# Patient Record
Sex: FEMALE | Race: WHITE | NOT HISPANIC OR LATINO | Employment: OTHER | URBAN - NONMETROPOLITAN AREA
[De-identification: names, ages, dates, MRNs, and addresses within clinical notes are randomized per-mention and may not be internally consistent; named-entity substitution may affect disease eponyms.]

---

## 2023-06-06 ENCOUNTER — OFFICE VISIT (OUTPATIENT)
Dept: PRIMARY CARE | Facility: CLINIC | Age: 68
End: 2023-06-06
Payer: MEDICARE

## 2023-06-06 VITALS
WEIGHT: 194 LBS | HEART RATE: 77 BPM | SYSTOLIC BLOOD PRESSURE: 130 MMHG | HEIGHT: 69 IN | OXYGEN SATURATION: 98 % | BODY MASS INDEX: 28.73 KG/M2 | DIASTOLIC BLOOD PRESSURE: 82 MMHG

## 2023-06-06 DIAGNOSIS — R00.2 PALPITATION: ICD-10-CM

## 2023-06-06 DIAGNOSIS — Z13.220 SCREENING, LIPID: ICD-10-CM

## 2023-06-06 DIAGNOSIS — R94.31 ABNORMAL EKG: ICD-10-CM

## 2023-06-06 DIAGNOSIS — Z12.31 ENCOUNTER FOR SCREENING MAMMOGRAM FOR MALIGNANT NEOPLASM OF BREAST: ICD-10-CM

## 2023-06-06 DIAGNOSIS — Z13.6 SCREENING FOR HEART DISEASE: ICD-10-CM

## 2023-06-06 DIAGNOSIS — Z01.818 PRE-OP EXAMINATION: Primary | ICD-10-CM

## 2023-06-06 PROBLEM — H33.001 RHEGMATOGENOUS RETINAL DETACHMENT OF RIGHT EYE: Status: ACTIVE | Noted: 2023-05-31

## 2023-06-06 PROBLEM — H52.13 SEVERE MYOPIA OF BOTH EYES: Status: ACTIVE | Noted: 2023-05-31

## 2023-06-06 PROCEDURE — 1157F ADVNC CARE PLAN IN RCRD: CPT | Performed by: INTERNAL MEDICINE

## 2023-06-06 PROCEDURE — 99203 OFFICE O/P NEW LOW 30 MIN: CPT | Performed by: INTERNAL MEDICINE

## 2023-06-06 PROCEDURE — 1036F TOBACCO NON-USER: CPT | Performed by: INTERNAL MEDICINE

## 2023-06-06 PROCEDURE — 1159F MED LIST DOCD IN RCRD: CPT | Performed by: INTERNAL MEDICINE

## 2023-06-06 PROCEDURE — 1160F RVW MEDS BY RX/DR IN RCRD: CPT | Performed by: INTERNAL MEDICINE

## 2023-06-06 ASSESSMENT — ENCOUNTER SYMPTOMS
WHEEZING: 0
BLOOD IN STOOL: 0
FATIGUE: 0
PALPITATIONS: 1
BACK PAIN: 0
CHEST TIGHTNESS: 0
SHORTNESS OF BREATH: 0
DIARRHEA: 0
ABDOMINAL PAIN: 0
VOMITING: 0
ARTHRALGIAS: 0
COUGH: 0
NAUSEA: 0

## 2023-06-06 NOTE — PROGRESS NOTES
"Subjective   Patient ID: Jazz Montiel is a 67 y.o. female who presents for No chief complaint on file..  HPI  She presents today for preoperative assessment.  This is the first time I am seeing her today.  She has been very healthy up to this time and typically does not go in regularly for doctors visits.  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  She states that she was given the option of \"twilight \"anesthetic as opposed to general anesthetic in the operating room.  She states she chose the latter.  We did do an EKG and I did inform her that her EKG is not completely normal.  It looks like she has an interventricular conduction delay.  We also discussed some risk factors and she informs me that her brother unfortunately had to have cardiac stents due to coronary artery disease.  She states she recalls that he was in his 70s when he had his issues.  She states that she has been very healthy and she has had no significant past medical problems.  She states she joined [employer] in March and has been going pretty regularly.  She states she goes 3 days out of the week and she does an hour of the program and then does an additional hour on the knotless equipment.  During those times she experiences no chest discomfort and no breathing difficulties.  She also denies feeling lightheaded or dizzy.  Her blood pressure is satisfactory today.  She states she is unsure as to whether she is even had her cholesterol checked in the past.  We talked about trying to get her procedure done in a timely fashion because of the retinal detachment.  I told her that typically we like to have time with preoperative assessments but given the fact that she has amazing exercise tolerance and an essentially benign past medical history I think it would be reasonable to proceed with her surgical intervention tomorrow if she does not under the \"twilight \"anesthetic.  " She has had several prior general anesthetic challenges and is done well with no complications from anesthetic.  She has had cosmetic surgery and general surgery .We can contact her surgeon about this and she is perfectly willing to choose that option.  We also discussed getting her back in for more thorough assessment of her condition.  We discussed checking a cholesterol we also discussed doing a coronary calcium scan.  I will also be ordering some lab work and she is also agreeable to getting caught up with a screening mammogram.  We did conduct a review of systems and her only real complaint today is that of some arthritis in her knees.  I will summarize everything in a problem based format  Review of Systems   Constitutional:  Negative for fatigue.   Respiratory:  Negative for cough, chest tightness, shortness of breath and wheezing.    Cardiovascular:  Positive for palpitations. Negative for chest pain and leg swelling.   Gastrointestinal:  Negative for abdominal pain, blood in stool, diarrhea, nausea and vomiting.   Musculoskeletal:  Negative for arthralgias and back pain.     Objective   Physical Exam  Vitals and nursing note reviewed.   Constitutional:       General: She is not in acute distress.     Appearance: Normal appearance.   HENT:      Head: Normocephalic and atraumatic.   Eyes:      Conjunctiva/sclera: Conjunctivae normal.   Cardiovascular:      Rate and Rhythm: Normal rate and regular rhythm.      Heart sounds: Normal heart sounds.   Pulmonary:      Effort: No respiratory distress.      Breath sounds: No wheezing.   Abdominal:      Palpations: Abdomen is soft.      Tenderness: There is no abdominal tenderness. There is no guarding.   Musculoskeletal:         General: No swelling. Normal range of motion.   Skin:     General: Skin is warm and dry.   Neurological:      General: No focal deficit present.      Mental Status: She is alert and oriented to person, place, and time.   Psychiatric:          Behavior: Behavior normal.       Assessment/Plan          Shayla Gilliland, DO

## 2023-06-06 NOTE — ASSESSMENT & PLAN NOTE
-Her palpitations she reports are very infrequent and do not produce any other symptoms.  -Cardiac auscultatory exam today appears normal

## 2023-06-06 NOTE — PATIENT INSTRUCTIONS
"As we discussed I believe that you will be able to partake in your surgical procedure tomorrow for your retinal detachment.  We realized that time is of the essence to get this procedure done as quickly as possible.  I am recommending that you not do the general anesthetic but instead do the \"twilight \"anesthetic.  We believe that risks are lower than with general anesthetic.  I am pleased with your exercise tolerance and the fact that you really have not had any significant health problems in the past  As we agreed we will see you back several weeks after you have completed your procedure and I would like for you to have a coronary calcium scan.  This test is a tool to look for possibly heart disease and because of your brother I think we should be screening you.  The test is free of charge and does not involve any needles or IVs.  There is some element of radiation exposure because it is a CAT scan.  I also would like for you to go to the lab at your earliest convenience and have fasting lab work to check your cholesterol, blood sugar, kidney function and liver.  When you return we will go over the results and if there is something significantly abnormal I will contact you earlier  The staff will also help you get your mammogram scheduled and then we can go over other health maintenance recommendations at your return visit  "

## 2023-06-06 NOTE — ASSESSMENT & PLAN NOTE
"-She is scheduled to have surgery on her right eye due to a retinal detachment for tomorrow.  We will contact her surgeon and indicate that she has been cleared for the procedure but we would like to go under the \"twilight \"anesthetic as opposed to a general anesthetic given today's circumstances.  She has no symptoms and exhibits pretty good exercise tolerance.  -Because of her abnormal EKG and family history of heart disease she has agreed to return forth further assessment.  I recommended she have a coronary calcium scan and we will also be checking a cholesterol in addition to some lab work.  "

## 2023-08-03 ENCOUNTER — LAB (OUTPATIENT)
Dept: LAB | Facility: LAB | Age: 68
End: 2023-08-03
Payer: MEDICARE

## 2023-08-03 DIAGNOSIS — Z13.6 SCREENING FOR HEART DISEASE: ICD-10-CM

## 2023-08-03 DIAGNOSIS — R00.2 PALPITATION: ICD-10-CM

## 2023-08-03 DIAGNOSIS — Z01.818 PRE-OP EXAMINATION: ICD-10-CM

## 2023-08-03 LAB
ALANINE AMINOTRANSFERASE (SGPT) (U/L) IN SER/PLAS: 22 U/L (ref 7–45)
ALBUMIN (G/DL) IN SER/PLAS: 4.1 G/DL (ref 3.4–5)
ALKALINE PHOSPHATASE (U/L) IN SER/PLAS: 100 U/L (ref 33–136)
ANION GAP IN SER/PLAS: 9 MMOL/L (ref 10–20)
ASPARTATE AMINOTRANSFERASE (SGOT) (U/L) IN SER/PLAS: 27 U/L (ref 9–39)
BASOPHILS (10*3/UL) IN BLOOD BY AUTOMATED COUNT: 0.03 X10E9/L (ref 0–0.1)
BASOPHILS/100 LEUKOCYTES IN BLOOD BY AUTOMATED COUNT: 0.6 % (ref 0–2)
BILIRUBIN TOTAL (MG/DL) IN SER/PLAS: 0.4 MG/DL (ref 0–1.2)
CALCIUM (MG/DL) IN SER/PLAS: 8.5 MG/DL (ref 8.6–10.3)
CARBON DIOXIDE, TOTAL (MMOL/L) IN SER/PLAS: 28 MMOL/L (ref 21–32)
CHLORIDE (MMOL/L) IN SER/PLAS: 108 MMOL/L (ref 98–107)
CHOLESTEROL (MG/DL) IN SER/PLAS: 223 MG/DL (ref 0–199)
CHOLESTEROL IN HDL (MG/DL) IN SER/PLAS: 50 MG/DL
CHOLESTEROL/HDL RATIO: 4.5
CREATININE (MG/DL) IN SER/PLAS: 0.79 MG/DL (ref 0.5–1.05)
EOSINOPHILS (10*3/UL) IN BLOOD BY AUTOMATED COUNT: 0.13 X10E9/L (ref 0–0.7)
EOSINOPHILS/100 LEUKOCYTES IN BLOOD BY AUTOMATED COUNT: 2.8 % (ref 0–6)
ERYTHROCYTE DISTRIBUTION WIDTH (RATIO) BY AUTOMATED COUNT: 13.3 % (ref 11.5–14.5)
ERYTHROCYTE MEAN CORPUSCULAR HEMOGLOBIN CONCENTRATION (G/DL) BY AUTOMATED: 31.6 G/DL (ref 32–36)
ERYTHROCYTE MEAN CORPUSCULAR VOLUME (FL) BY AUTOMATED COUNT: 101 FL (ref 80–100)
ERYTHROCYTES (10*6/UL) IN BLOOD BY AUTOMATED COUNT: 4.5 X10E12/L (ref 4–5.2)
GFR FEMALE: 82 ML/MIN/1.73M2
GLUCOSE (MG/DL) IN SER/PLAS: 91 MG/DL (ref 74–99)
HEMATOCRIT (%) IN BLOOD BY AUTOMATED COUNT: 45.3 % (ref 36–46)
HEMOGLOBIN (G/DL) IN BLOOD: 14.3 G/DL (ref 12–16)
IMMATURE GRANULOCYTES/100 LEUKOCYTES IN BLOOD BY AUTOMATED COUNT: 0 % (ref 0–0.9)
LDL: 154 MG/DL (ref 0–99)
LEUKOCYTES (10*3/UL) IN BLOOD BY AUTOMATED COUNT: 4.6 X10E9/L (ref 4.4–11.3)
LYMPHOCYTES (10*3/UL) IN BLOOD BY AUTOMATED COUNT: 1.94 X10E9/L (ref 1.2–4.8)
LYMPHOCYTES/100 LEUKOCYTES IN BLOOD BY AUTOMATED COUNT: 41.8 % (ref 13–44)
MONOCYTES (10*3/UL) IN BLOOD BY AUTOMATED COUNT: 0.5 X10E9/L (ref 0.1–1)
MONOCYTES/100 LEUKOCYTES IN BLOOD BY AUTOMATED COUNT: 10.8 % (ref 2–10)
NEUTROPHILS (10*3/UL) IN BLOOD BY AUTOMATED COUNT: 2.04 X10E9/L (ref 1.2–7.7)
NEUTROPHILS/100 LEUKOCYTES IN BLOOD BY AUTOMATED COUNT: 44 % (ref 40–80)
PLATELETS (10*3/UL) IN BLOOD AUTOMATED COUNT: 191 X10E9/L (ref 150–450)
POTASSIUM (MMOL/L) IN SER/PLAS: 4.2 MMOL/L (ref 3.5–5.3)
PROTEIN TOTAL: 6 G/DL (ref 6.4–8.2)
SODIUM (MMOL/L) IN SER/PLAS: 141 MMOL/L (ref 136–145)
TRIGLYCERIDE (MG/DL) IN SER/PLAS: 93 MG/DL (ref 0–149)
UREA NITROGEN (MG/DL) IN SER/PLAS: 13 MG/DL (ref 6–23)
VLDL: 19 MG/DL (ref 0–40)

## 2023-08-03 PROCEDURE — 36415 COLL VENOUS BLD VENIPUNCTURE: CPT

## 2023-08-03 PROCEDURE — 80061 LIPID PANEL: CPT

## 2023-08-03 PROCEDURE — 80053 COMPREHEN METABOLIC PANEL: CPT

## 2023-08-03 PROCEDURE — 85025 COMPLETE CBC W/AUTO DIFF WBC: CPT

## 2023-08-04 NOTE — RESULT ENCOUNTER NOTE
I attempted to call her Friday afternoon about her mammogram but I could not get through when I left a voicemail that I was trying to reach her about her mammogram.  When she calls just to explain that the mammogram shows some abnormalities in both breasts and the radiologist is either wanting to get a hold of a previous mammogram result or for us to order ultrasounds.  As she had a mammogram at another institution?  Thank you

## 2023-08-08 DIAGNOSIS — R92.8 ABNORMAL MAMMOGRAM OF BOTH BREASTS: Primary | ICD-10-CM

## 2023-08-09 ENCOUNTER — OFFICE VISIT (OUTPATIENT)
Dept: PRIMARY CARE | Facility: CLINIC | Age: 68
End: 2023-08-09
Payer: MEDICARE

## 2023-08-09 VITALS
DIASTOLIC BLOOD PRESSURE: 74 MMHG | SYSTOLIC BLOOD PRESSURE: 128 MMHG | WEIGHT: 191 LBS | BODY MASS INDEX: 28.95 KG/M2 | HEIGHT: 68 IN | HEART RATE: 74 BPM

## 2023-08-09 DIAGNOSIS — E78.2 MIXED HYPERLIPIDEMIA: ICD-10-CM

## 2023-08-09 DIAGNOSIS — Z00.00 MEDICARE ANNUAL WELLNESS VISIT, SUBSEQUENT: Primary | ICD-10-CM

## 2023-08-09 PROBLEM — Z01.818 PRE-OP EXAMINATION: Status: RESOLVED | Noted: 2023-06-06 | Resolved: 2023-08-09

## 2023-08-09 PROCEDURE — 1160F RVW MEDS BY RX/DR IN RCRD: CPT | Performed by: INTERNAL MEDICINE

## 2023-08-09 PROCEDURE — 1157F ADVNC CARE PLAN IN RCRD: CPT | Performed by: INTERNAL MEDICINE

## 2023-08-09 PROCEDURE — 1036F TOBACCO NON-USER: CPT | Performed by: INTERNAL MEDICINE

## 2023-08-09 PROCEDURE — 1159F MED LIST DOCD IN RCRD: CPT | Performed by: INTERNAL MEDICINE

## 2023-08-09 PROCEDURE — 99213 OFFICE O/P EST LOW 20 MIN: CPT | Performed by: INTERNAL MEDICINE

## 2023-08-09 PROCEDURE — G0009 ADMIN PNEUMOCOCCAL VACCINE: HCPCS | Performed by: INTERNAL MEDICINE

## 2023-08-09 PROCEDURE — 90677 PCV20 VACCINE IM: CPT | Performed by: INTERNAL MEDICINE

## 2023-08-09 PROCEDURE — G0439 PPPS, SUBSEQ VISIT: HCPCS | Performed by: INTERNAL MEDICINE

## 2023-08-09 PROCEDURE — 1170F FXNL STATUS ASSESSED: CPT | Performed by: INTERNAL MEDICINE

## 2023-08-09 ASSESSMENT — ENCOUNTER SYMPTOMS
BACK PAIN: 0
VOMITING: 0
BLOOD IN STOOL: 0
ABDOMINAL PAIN: 0
ARTHRALGIAS: 0
FATIGUE: 0
NAUSEA: 0
SHORTNESS OF BREATH: 0
PALPITATIONS: 0
CHEST TIGHTNESS: 0
DIARRHEA: 0
WHEEZING: 0
COUGH: 0

## 2023-08-09 ASSESSMENT — ACTIVITIES OF DAILY LIVING (ADL)
DRESSING: INDEPENDENT
DOING_HOUSEWORK: INDEPENDENT
TAKING_MEDICATION: INDEPENDENT
MANAGING_FINANCES: INDEPENDENT
GROCERY_SHOPPING: INDEPENDENT
BATHING: INDEPENDENT

## 2023-08-09 ASSESSMENT — PATIENT HEALTH QUESTIONNAIRE - PHQ9
2. FEELING DOWN, DEPRESSED OR HOPELESS: NOT AT ALL
SUM OF ALL RESPONSES TO PHQ9 QUESTIONS 1 AND 2: 0
1. LITTLE INTEREST OR PLEASURE IN DOING THINGS: NOT AT ALL

## 2023-08-09 NOTE — PATIENT INSTRUCTIONS
Please read the handouts that I sent to you via [employer].  There are 2 separate handouts and a video.  Please work on your diet and exercise regimen and we invite you to return in 6 months for another fasting lab test.  Please try to get your lab test done about a week before our visit  I am very pleased that you decided to receive the pneumonia vaccine today as it is very important for prevention  The flu vaccine will become available next month  We briefly discussed getting the [employer] screening so you might want to consider that

## 2023-08-09 NOTE — PROGRESS NOTES
Subjective   Reason for Visit: Jazz Montiel is an 67 y.o. female here for a Medicare Wellness visit.     Past Medical, Surgical, and Family History reviewed and updated in chart.    Reviewed all medications by prescribing practitioner or clinical pharmacist (such as prescriptions, OTCs, herbal therapies and supplements) and documented in the medical record.    HPI  She is here today for her follow-up visit as we saw her back in early June.  She had successful right-sided eye surgery and is healing adequately.  We did conduct a full review of systems and also went through the Medicare questionnaire.  She is highly independent doing everything for herself including managing her own finances and all of her activities of daily living.  She denies any symptoms of depression.  She did have 1 fall within the last year when she was in her garden and simply tripped.  She states she did not really injure herself and she really currently does not have any concerns about her balance.  We did talk about fall prevention and she does have grab bars in the bathroom.  We also discussed eliminating any unnecessary rugs.  Her memory appears to be good and she is very physically active.  She was going to [employer] regularly and then after her eye surgery she slowed down a bit.  She plans on getting back on track.  We also reviewed her coronary calcium scan.  I was pleased to inform her that her composite score came back 0.  We talked about the implications of these findings but we also talked about some of the shortcomings and that sometimes coronary disease can be missed with the stool.  We also discussed her cholesterol profile and now that she is aware it is elevated she is going to work on dietary modifications and we will see her back in 6 months for reevaluation.  I have also recommended that she may want to consider doing the [employer] screen just to check her carotids and her aorta and her lower extremity circulation.  We  "also went over her other laboratory test results and for the most part everything came back looking good.  She is looking well.  We will see her back in 6 months.  Patient Care Team:  Shayla Gilliland DO as PCP - General (Internal Medicine)  Shayla Gilliland DO     Review of Systems   Constitutional:  Negative for fatigue.   Respiratory:  Negative for cough, chest tightness, shortness of breath and wheezing.    Cardiovascular:  Negative for chest pain, palpitations and leg swelling.   Gastrointestinal:  Negative for abdominal pain, blood in stool, diarrhea, nausea and vomiting.   Musculoskeletal:  Negative for arthralgias and back pain.       Objective   Vitals:  /74 (BP Location: Left arm, Patient Position: Sitting)   Pulse 74   Ht 1.727 m (5' 8\")   Wt 86.6 kg (191 lb)   BMI 29.04 kg/m²       Physical Exam  Vitals and nursing note reviewed.   Constitutional:       General: She is not in acute distress.     Appearance: Normal appearance.   HENT:      Head: Normocephalic and atraumatic.   Eyes:      Conjunctiva/sclera: Conjunctivae normal.   Cardiovascular:      Rate and Rhythm: Normal rate and regular rhythm.      Heart sounds: Normal heart sounds.   Pulmonary:      Effort: No respiratory distress.      Breath sounds: No wheezing.   Abdominal:      Palpations: Abdomen is soft.      Tenderness: There is no abdominal tenderness. There is no guarding.   Musculoskeletal:         General: No swelling. Normal range of motion.   Skin:     General: Skin is warm and dry.   Neurological:      General: No focal deficit present.      Mental Status: She is alert and oriented to person, place, and time.   Psychiatric:         Behavior: Behavior normal.       Recent Results (from the past 672 hour(s))   Lipid panel    Collection Time: 08/03/23  7:04 AM   Result Value Ref Range    Cholesterol 223 (H) 0 - 199 mg/dL    HDL 50.0 mg/dL    Cholesterol/HDL Ratio 4.5      (H) 0 - 99 mg/dL    VLDL 19 0 - 40 mg/dL    " Triglycerides 93 0 - 149 mg/dL   Comprehensive Metabolic Panel    Collection Time: 08/03/23  7:04 AM   Result Value Ref Range    Glucose 91 74 - 99 mg/dL    Sodium 141 136 - 145 mmol/L    Potassium 4.2 3.5 - 5.3 mmol/L    Chloride 108 (H) 98 - 107 mmol/L    Bicarbonate 28 21 - 32 mmol/L    Anion Gap 9 (L) 10 - 20 mmol/L    Urea Nitrogen 13 6 - 23 mg/dL    Creatinine 0.79 0.50 - 1.05 mg/dL    GFR Female 82 >90 mL/min/1.73m2    Calcium 8.5 (L) 8.6 - 10.3 mg/dL    Albumin 4.1 3.4 - 5.0 g/dL    Alkaline Phosphatase 100 33 - 136 U/L    Total Protein 6.0 (L) 6.4 - 8.2 g/dL    AST 27 9 - 39 U/L    Total Bilirubin 0.4 0.0 - 1.2 mg/dL    ALT (SGPT) 22 7 - 45 U/L   CBC and Auto Differential    Collection Time: 08/03/23  7:04 AM   Result Value Ref Range    WBC 4.6 4.4 - 11.3 x10E9/L    RBC 4.50 4.00 - 5.20 x10E12/L    Hemoglobin 14.3 12.0 - 16.0 g/dL    Hematocrit 45.3 36.0 - 46.0 %     (H) 80 - 100 fL    MCHC 31.6 (L) 32.0 - 36.0 g/dL    Platelets 191 150 - 450 x10E9/L    RDW 13.3 11.5 - 14.5 %    Neutrophils % 44.0 40.0 - 80.0 %    Immature Granulocytes %, Automated 0.0 0.0 - 0.9 %    Lymphocytes % 41.8 13.0 - 44.0 %    Monocytes % 10.8 2.0 - 10.0 %    Eosinophils % 2.8 0.0 - 6.0 %    Basophils % 0.6 0.0 - 2.0 %    Neutrophils Absolute 2.04 1.20 - 7.70 x10E9/L    Lymphocytes Absolute 1.94 1.20 - 4.80 x10E9/L    Monocytes Absolute 0.50 0.10 - 1.00 x10E9/L    Eosinophils Absolute 0.13 0.00 - 0.70 x10E9/L    Basophils Absolute 0.03 0.00 - 0.10 x10E9/L         Assessment/Plan   Problem List Items Addressed This Visit       Medicare annual wellness visit, subsequent - Primary     -We talked about fall prevention         Mixed hyperlipidemia     -We discussed the elevated cholesterol  -I sent 2 handouts and a video to her via [employer] about cholesterol, diet, and also the evidence about supplements  -She will work on diet and we will see her back in approximately 6 months for another fasting lipid profile         Relevant  Orders    Lipid Panel

## 2023-08-09 NOTE — ASSESSMENT & PLAN NOTE
-We discussed the elevated cholesterol  -I sent 2 handouts and a video to her via [employer] about cholesterol, diet, and also the evidence about supplements  -She will work on diet and we will see her back in approximately 6 months for another fasting lipid profile

## 2023-08-10 ENCOUNTER — TELEPHONE (OUTPATIENT)
Dept: PRIMARY CARE | Facility: CLINIC | Age: 68
End: 2023-08-10
Payer: MEDICARE

## 2023-08-10 DIAGNOSIS — R92.8 ABNORMAL MAMMOGRAM OF BOTH BREASTS: Primary | ICD-10-CM

## 2023-08-10 NOTE — TELEPHONE ENCOUNTER
"CAN YOU CHANGE THE ORDER FOR PT'S BILATERAL BREAST ULTRASOUND TO \"LIMITED\"  INSTEAD OF COMPLETE?   [facility] CAN'T SCHEDULE IT WITH \"COMPLETE\".  IT ALSO HAS TO BE TWO SEPARATE ORDERS  -   ONE FOR THE LEFT AND ONE FOR THE RIGHT.  "

## 2023-08-17 DIAGNOSIS — N63.20 MASS OF BOTH BREASTS ON MAMMOGRAM: Primary | ICD-10-CM

## 2023-08-17 DIAGNOSIS — N63.10 MASS OF BOTH BREASTS ON MAMMOGRAM: Primary | ICD-10-CM

## 2023-08-21 ENCOUNTER — TELEPHONE (OUTPATIENT)
Dept: PRIMARY CARE | Facility: CLINIC | Age: 68
End: 2023-08-21
Payer: MEDICARE

## 2023-08-21 DIAGNOSIS — R92.8 ABNORMALITY OF BOTH BREASTS ON SCREENING MAMMOGRAM: Primary | ICD-10-CM

## 2023-08-21 NOTE — TELEPHONE ENCOUNTER
Please change mammo order to Bilateral Diagnotic [facility] (you only order separately for ultrasounds).

## 2023-11-21 ENCOUNTER — CLINICAL SUPPORT (OUTPATIENT)
Dept: PRIMARY CARE | Facility: CLINIC | Age: 68
End: 2023-11-21
Payer: MEDICARE

## 2023-11-21 DIAGNOSIS — R19.5 POSITIVE FECAL OCCULT BLOOD TEST: Primary | ICD-10-CM

## 2023-11-21 DIAGNOSIS — Z12.11 ENCOUNTER FOR SCREENING FECAL OCCULT BLOOD TESTING: ICD-10-CM

## 2023-11-21 DIAGNOSIS — R19.5 POSITIVE FECAL OCCULT BLOOD TEST: ICD-10-CM

## 2023-11-21 LAB — POC FECAL OCCULT BLOOD IMMUNOASSAY: POSITIVE

## 2023-11-21 PROCEDURE — 82274 ASSAY TEST FOR BLOOD FECAL: CPT | Performed by: INTERNAL MEDICINE

## 2023-11-21 NOTE — RESULT ENCOUNTER NOTE
I called her about her positive FOBT result and she is agreeable to following up with a colonoscopy.  I have placed the order and she will be awaiting her call.  Thank you

## 2024-01-17 ENCOUNTER — HOSPITAL ENCOUNTER (OUTPATIENT)
Dept: GASTROENTEROLOGY | Facility: CLINIC | Age: 69
Setting detail: OUTPATIENT SURGERY
Discharge: HOME | End: 2024-01-17
Payer: MEDICARE

## 2024-01-17 VITALS
HEART RATE: 75 BPM | TEMPERATURE: 97.6 F | WEIGHT: 199.96 LBS | DIASTOLIC BLOOD PRESSURE: 77 MMHG | BODY MASS INDEX: 30.4 KG/M2 | RESPIRATION RATE: 16 BRPM | SYSTOLIC BLOOD PRESSURE: 115 MMHG | OXYGEN SATURATION: 99 %

## 2024-01-17 DIAGNOSIS — R19.5 POSITIVE FECAL OCCULT BLOOD TEST: Primary | ICD-10-CM

## 2024-01-17 PROCEDURE — 45382 COLONOSCOPY W/CONTROL BLEED: CPT | Mod: 59,PT | Performed by: INTERNAL MEDICINE

## 2024-01-17 PROCEDURE — 45385 COLONOSCOPY W/LESION REMOVAL: CPT | Performed by: INTERNAL MEDICINE

## 2024-01-17 PROCEDURE — 7100000010 HC PHASE TWO TIME - EACH INCREMENTAL 1 MINUTE

## 2024-01-17 PROCEDURE — 88305 TISSUE EXAM BY PATHOLOGIST: CPT | Performed by: PATHOLOGY

## 2024-01-17 PROCEDURE — 2500000004 HC RX 250 GENERAL PHARMACY W/ HCPCS (ALT 636 FOR OP/ED): Performed by: INTERNAL MEDICINE

## 2024-01-17 PROCEDURE — 3700000013 HC SEDATION LEVEL 5+ TIME - EACH ADDITIONAL 15 MINUTES

## 2024-01-17 PROCEDURE — 45382 COLONOSCOPY W/CONTROL BLEED: CPT | Performed by: INTERNAL MEDICINE

## 2024-01-17 PROCEDURE — G0500 MOD SEDAT ENDO SERVICE >5YRS: HCPCS | Performed by: INTERNAL MEDICINE

## 2024-01-17 PROCEDURE — 99153 MOD SED SAME PHYS/QHP EA: CPT | Performed by: INTERNAL MEDICINE

## 2024-01-17 PROCEDURE — 7100000009 HC PHASE TWO TIME - INITIAL BASE CHARGE

## 2024-01-17 PROCEDURE — 2720000007 HC OR 272 NO HCPCS

## 2024-01-17 PROCEDURE — 3700000012 HC SEDATION LEVEL 5+ TIME - INITIAL 15 MINUTES 5/> YEARS

## 2024-01-17 PROCEDURE — 88305 TISSUE EXAM BY PATHOLOGIST: CPT | Mod: TC,SUR,SAMLAB | Performed by: INTERNAL MEDICINE

## 2024-01-17 RX ORDER — MIDAZOLAM HYDROCHLORIDE 1 MG/ML
INJECTION, SOLUTION INTRAMUSCULAR; INTRAVENOUS AS NEEDED
Status: COMPLETED | OUTPATIENT
Start: 2024-01-17 | End: 2024-01-17

## 2024-01-17 RX ORDER — MEPERIDINE HYDROCHLORIDE 50 MG/ML
INJECTION INTRAMUSCULAR; INTRAVENOUS; SUBCUTANEOUS AS NEEDED
Status: COMPLETED | OUTPATIENT
Start: 2024-01-17 | End: 2024-01-17

## 2024-01-17 RX ORDER — SODIUM CHLORIDE, SODIUM LACTATE, POTASSIUM CHLORIDE, CALCIUM CHLORIDE 600; 310; 30; 20 MG/100ML; MG/100ML; MG/100ML; MG/100ML
20 INJECTION, SOLUTION INTRAVENOUS CONTINUOUS
Status: DISCONTINUED | OUTPATIENT
Start: 2024-01-17 | End: 2024-01-18 | Stop reason: HOSPADM

## 2024-01-17 RX ADMIN — GLUCAGON HYDROCHLORIDE 1 MG: KIT at 07:54

## 2024-01-17 RX ADMIN — MIDAZOLAM 2.5 MG: 1 INJECTION INTRAMUSCULAR; INTRAVENOUS at 07:53

## 2024-01-17 RX ADMIN — MEPERIDINE HYDROCHLORIDE 25 MG: 50 INJECTION INTRAMUSCULAR; INTRAVENOUS; SUBCUTANEOUS at 07:52

## 2024-01-17 RX ADMIN — SODIUM CHLORIDE, POTASSIUM CHLORIDE, SODIUM LACTATE AND CALCIUM CHLORIDE 20 ML/HR: 600; 310; 30; 20 INJECTION, SOLUTION INTRAVENOUS at 07:33

## 2024-01-17 RX ADMIN — MIDAZOLAM 2.5 MG: 1 INJECTION INTRAMUSCULAR; INTRAVENOUS at 07:57

## 2024-01-17 RX ADMIN — MEPERIDINE HYDROCHLORIDE 25 MG: 50 INJECTION INTRAMUSCULAR; INTRAVENOUS; SUBCUTANEOUS at 07:57

## 2024-01-17 ASSESSMENT — PAIN SCALES - GENERAL
PAINLEVEL_OUTOF10: 0 - NO PAIN

## 2024-01-17 ASSESSMENT — PAIN - FUNCTIONAL ASSESSMENT: PAIN_FUNCTIONAL_ASSESSMENT: 0-10

## 2024-01-17 ASSESSMENT — COLUMBIA-SUICIDE SEVERITY RATING SCALE - C-SSRS
2. HAVE YOU ACTUALLY HAD ANY THOUGHTS OF KILLING YOURSELF?: NO
6. HAVE YOU EVER DONE ANYTHING, STARTED TO DO ANYTHING, OR PREPARED TO DO ANYTHING TO END YOUR LIFE?: NO
1. IN THE PAST MONTH, HAVE YOU WISHED YOU WERE DEAD OR WISHED YOU COULD GO TO SLEEP AND NOT WAKE UP?: NO

## 2024-01-17 NOTE — DISCHARGE INSTRUCTIONS
Patient Instructions after a Colonoscopy      The anesthetics, sedatives or narcotics which were given to you today will be acting in your body for the next 24 hours, so you might feel a little sleepy or groggy.  This feeling should slowly wear off. Carefully read and follow the instructions.     You received sedation today:  - Do not drive or operate any machinery or power tools of any kind.   - No alcoholic beverages today, not even beer or wine.  - Do not make any important decisions or sign any legal documents.  - No over the counter medications that contain alcohol or that may cause drowsiness.  - Do not make any important decisions or sign any legal documents.    While it is common to experience mild to moderate abdominal distention, gas, or belching after your procedure, if any of these symptoms occur following discharge from the GI Lab or within one week of having your procedure, call the [facility] to be advised whether a visit to your nearest Urgent Care or Emergency Department is indicated.  Take this paper with you if you go.     - If you develop an allergic reaction to the medications that were given during your procedure such as difficulty breathing, rash, hives, severe nausea, vomiting or lightheadedness.  - If you experience chest pain, shortness of breath, severe abdominal pain, fevers and chills.  -If you develop signs and symptoms of bleeding such as blood in your spit, if your stools turn black, tarry, or bloody  - If you have not urinated within 8 hours following your procedure.  - If your IV site becomes painful, red, inflamed, or looks infected.    If you received a biopsy/polypectomy/sphincterotomy the following instructions apply below:    __ Do not use Aspirin containing products, non-steroidal medications or anti-coagulants for one week following your procedure. (Examples of these types of medications are: Advil, Arthrotec, Aleve, Coumadin, Ecotrin, Heparin, Ibuprofen,  Indocin, Motrin, Naprosyn, Nuprin, Plavix, Vioxx, and Voltarin, or their generic forms.  This list is not all-inclusive.  Check with your physician or pharmacist before resuming medications.)   __ Eat a soft diet today.  Avoid foods that are poorly digested for the next 24 hours.  These foods would include: nuts, beans, lettuce, red meats, and fried foods. Start with liquids and advance your diet as tolerated, gradually work up to eating solids.   __ Do not have a Barium Study or Enema for one week.    Your physician recommends the additional following instructions:    -You have a contact number available for emergencies. The signs and symptoms of potential delayed complications were discussed with you. You may return to normal activities tomorrow.  -Resume your previous diet.  -Continue your present medications.   -We are waiting for your pathology results.  -Your physician has recommended a repeat colonoscopy (date to be determined after pending pathology results are reviewed) for surveillance based on pathology results.  -The findings and recommendations have been discussed with you.  -The findings and recommendations were discussed with your family.  - Please see Medication Reconciliation Form for new medication/medications prescribed.       If you experience any problems or have any questions following discharge from the GI Lab, please call:        Nurse Signature                                                                        Date___________________                                                                            Patient/Responsible Party Signature                                        Date___________________

## 2024-01-17 NOTE — H&P
History Of Present Illness  Jazz Montiel is a 68 y.o. female presenting with colon cancer screening.     Past Medical History  Past Medical History:   Diagnosis Date    Personal history of other medical treatment     History of screening mammography     Surgical History  Past Surgical History:   Procedure Laterality Date    OTHER SURGICAL HISTORY  03/02/2020    Abdominoplasty    OTHER SURGICAL HISTORY  03/02/2020    Breast reduction    OTHER SURGICAL HISTORY  03/02/2020    Tonsillectomy    OTHER SURGICAL HISTORY  03/02/2020    Jaw surgery    RETINAL DETACHMENT SURGERY      SCLERAL BUCKLE PROCEDURE Right      Social History  She reports that she has never smoked. She has never used smokeless tobacco. She reports that she does not currently use alcohol. She reports that she does not currently use drugs.    Family History  Family History   Problem Relation Name Age of Onset    No Known Problems Mother      No Known Problems Father          Allergies  No Known Allergies  Review of Systems  Pre-sedation Evaluation:  ASA Classification - ASA 2 - Patient with mild systemic disease with no functional limitations  Mallampati Score - II (hard and soft palate, upper portion of tonsils anduvula visible)    Physical Exam  Vitals and nursing note reviewed.   Constitutional:       General: She is awake.      Appearance: Normal appearance.   HENT:      Head: Normocephalic and atraumatic.      Nose: Nose normal.      Mouth/Throat:      Mouth: Mucous membranes are moist.      Pharynx: Oropharynx is clear.   Eyes:      Conjunctiva/sclera: Conjunctivae normal.      Pupils: Pupils are equal, round, and reactive to light.   Cardiovascular:      Rate and Rhythm: Normal rate.      Pulses: Normal pulses.      Heart sounds: Normal heart sounds.   Pulmonary:      Effort: Pulmonary effort is normal.      Breath sounds: Normal breath sounds.   Abdominal:      General: Abdomen is flat. Bowel sounds are normal.      Palpations: Abdomen is soft.    Musculoskeletal:      Cervical back: Normal range of motion and neck supple.   Skin:     General: Skin is warm and dry.   Neurological:      General: No focal deficit present.      Mental Status: She is alert and oriented to person, place, and time. Mental status is at baseline.   Psychiatric:         Attention and Perception: Attention and perception normal.         Mood and Affect: Mood normal.         Behavior: Behavior normal.          Last Recorded Vitals  Blood pressure 123/79, pulse 76, temperature 36.4 °C (97.6 °F), resp. rate 16, weight 90.7 kg (199 lb 15.3 oz), SpO2 97 %.     Assessment/Plan   Problem List Items Addressed This Visit       Positive fecal occult blood test - Primary    Relevant Orders    Referral to Gastroenterology    Colonoscopy Screening; High Risk Patient          PTA/Current Medications:  (Not in a hospital admission)    No current outpatient medications on file.     No current facility-administered medications for this encounter.     Michael Monaco, DO

## 2024-01-23 LAB
LABORATORY COMMENT REPORT: NORMAL
PATH REPORT.FINAL DX SPEC: NORMAL
PATH REPORT.GROSS SPEC: NORMAL
PATH REPORT.TOTAL CANCER: NORMAL

## 2024-01-30 ENCOUNTER — TELEPHONE (OUTPATIENT)
Dept: GASTROENTEROLOGY | Facility: CLINIC | Age: 69
End: 2024-01-30
Payer: MEDICARE

## 2024-01-30 NOTE — TELEPHONE ENCOUNTER
PATIENT NOTIFIED AND VERBALIZED UNDERSTANDING 5 year repeat card made and filed    ----- Message from Michael Monaco DO sent at 1/24/2024  8:57 AM EST -----   Repeat colonoscopy in 5 years adenomatous polyp

## 2025-04-29 ENCOUNTER — OFFICE VISIT (OUTPATIENT)
Age: 70
End: 2025-04-29
Payer: MEDICARE

## 2025-04-29 VITALS
WEIGHT: 215 LBS | SYSTOLIC BLOOD PRESSURE: 158 MMHG | HEIGHT: 68 IN | DIASTOLIC BLOOD PRESSURE: 98 MMHG | BODY MASS INDEX: 32.58 KG/M2 | HEART RATE: 108 BPM | OXYGEN SATURATION: 94 %

## 2025-04-29 DIAGNOSIS — R41.3 MEMORY LOSS: ICD-10-CM

## 2025-04-29 DIAGNOSIS — R03.0 ELEVATED BLOOD PRESSURE READING: ICD-10-CM

## 2025-04-29 DIAGNOSIS — E78.2 MIXED HYPERLIPIDEMIA: ICD-10-CM

## 2025-04-29 DIAGNOSIS — R32 INCONTINENCE OF URINE IN FEMALE: ICD-10-CM

## 2025-04-29 DIAGNOSIS — Z12.31 ENCOUNTER FOR SCREENING MAMMOGRAM FOR MALIGNANT NEOPLASM OF BREAST: ICD-10-CM

## 2025-04-29 DIAGNOSIS — R53.83 OTHER FATIGUE: ICD-10-CM

## 2025-04-29 DIAGNOSIS — R30.0 DYSURIA: Primary | ICD-10-CM

## 2025-04-29 LAB
POC APPEARANCE, URINE: CLEAR
POC BILIRUBIN, URINE: NEGATIVE
POC BLOOD, URINE: NEGATIVE
POC COLOR, URINE: ABNORMAL
POC GLUCOSE, URINE: NEGATIVE MG/DL
POC KETONES, URINE: NEGATIVE MG/DL
POC LEUKOCYTES, URINE: ABNORMAL
POC NITRITE,URINE: NEGATIVE
POC PH, URINE: 5.5 PH
POC PROTEIN, URINE: NEGATIVE MG/DL
POC SPECIFIC GRAVITY, URINE: >=1.03
POC UROBILINOGEN, URINE: 0.2 EU/DL

## 2025-04-29 PROCEDURE — G2211 COMPLEX E/M VISIT ADD ON: HCPCS | Performed by: INTERNAL MEDICINE

## 2025-04-29 PROCEDURE — 1036F TOBACCO NON-USER: CPT | Performed by: INTERNAL MEDICINE

## 2025-04-29 PROCEDURE — 1159F MED LIST DOCD IN RCRD: CPT | Performed by: INTERNAL MEDICINE

## 2025-04-29 PROCEDURE — 81003 URINALYSIS AUTO W/O SCOPE: CPT | Performed by: INTERNAL MEDICINE

## 2025-04-29 PROCEDURE — 3008F BODY MASS INDEX DOCD: CPT | Performed by: INTERNAL MEDICINE

## 2025-04-29 PROCEDURE — 99214 OFFICE O/P EST MOD 30 MIN: CPT | Performed by: INTERNAL MEDICINE

## 2025-04-29 PROCEDURE — 1157F ADVNC CARE PLAN IN RCRD: CPT | Performed by: INTERNAL MEDICINE

## 2025-04-29 ASSESSMENT — ENCOUNTER SYMPTOMS
BLOOD IN STOOL: 0
FEVER: 0
DIARRHEA: 0
ABDOMINAL PAIN: 0
BACK PAIN: 0
COUGH: 0
FATIGUE: 1
NAUSEA: 0
WHEEZING: 0
SHORTNESS OF BREATH: 0
ARTHRALGIAS: 0
VOMITING: 0
CHEST TIGHTNESS: 0

## 2025-04-29 ASSESSMENT — PATIENT HEALTH QUESTIONNAIRE - PHQ9
1. LITTLE INTEREST OR PLEASURE IN DOING THINGS: NOT AT ALL
2. FEELING DOWN, DEPRESSED OR HOPELESS: NOT AT ALL
SUM OF ALL RESPONSES TO PHQ9 QUESTIONS 1 AND 2: 0

## 2025-04-29 NOTE — ASSESSMENT & PLAN NOTE
- Her blood pressure may be elevated today due to her morning and she has agreed to check it at home when she has the opportunity to sit relax for 10 to 20 minutes.  I have asked that she give us an update in the next 24 to 48 hours

## 2025-04-29 NOTE — PATIENT INSTRUCTIONS
Patient instructions  As we discussed I am sending your urine away for culture to see if you have an infection.  If the culture comes back positive we will contact you right away.  I have also asked that you go for fasting lab work at your earliest convenience.  We are checking several labs because of your symptoms of fatigue and so forth.  Again if something comes back that is severely abnormal I will contact you right away.  Otherwise I do want to see you back relatively soon to go over results and we will do a formal memory test  Please remember that your blood pressure was elevated today and it could be because you were flustered this morning.  Please check your blood pressure at home with your personal monitor when you have an opportunity to sit relax for 10 to 20 minutes.  Please give me an update on your blood pressure readings in the next 24 to 48 hours.  If you develop sudden vision changes, numbness, tingling, or weakness of your extremities, or you are not able to talk or other strokelike symptoms please go to the emergency room immediately  We will see you back in follow-up and have also ordered a screening mammogram

## 2025-04-29 NOTE — ASSESSMENT & PLAN NOTE
- Her urinalysis showed a small amount of leukocytes.  We will check a urine culture as the next step of her evaluation and she will return to go over the results

## 2025-04-29 NOTE — ASSESSMENT & PLAN NOTE
- We will perform a Folstein Mini-Mental status examination when she returns and I am ordering a thyroid and B12 as part of her assessment

## 2025-04-29 NOTE — PROGRESS NOTES
Subjective   Patient ID: Jazz Montiel is a 69 y.o. female who presents for UTI.  UTI   Pertinent negatives include no nausea or vomiting.   She comes in today with multiple concerns.  She thought perhaps she might have a urinary tract infection and we did check a urinalysis which simply showed a small amount of leukocytes.  The rest of it looked normal.  I will be sending it away for culture.  She states that over the past 7 months she has had 2 occasions where she wet herself at night while she was in a deep sleep.  She denies seeing blood in her urine.  She states she has been feeling more tired in recent times and wants to sleep more.  She also states that she is finding that her memory has changed.  She sometimes has difficulties finding certain words.  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  She states this was unusual for her.  She finds a difficulty staying on task as well.  She denies any focal numbness, tingling, or weakness and no vision disturbances at [employer].  Her blood pressure is elevated today but she thinks is because she went to the wrong office first and she was flustered.  She did not know that the location of the office had changed.  She has a blood pressure monitor at home and has agreed to check it while she has the opportunity to sit relax for 10 to 20 minutes.  We have formulated a plan for her assessment and I will carefully outline a set of instructions for her that she can take with her today.  Review of Systems   Constitutional:  Positive for fatigue. Negative for fever.   Respiratory:  Negative for cough, chest tightness, shortness of breath and wheezing.    Cardiovascular:  Positive for leg swelling. Negative for chest pain.        Rare palpitations   Gastrointestinal:  Negative for abdominal pain, blood in stool, diarrhea, nausea and vomiting.   Musculoskeletal:   Negative for arthralgias and back pain.     Objective   Physical Exam  Vitals and nursing note reviewed.   Constitutional:       General: She is not in acute distress.     Appearance: Normal appearance.   HENT:      Head: Normocephalic and atraumatic.   Eyes:      Conjunctiva/sclera: Conjunctivae normal.   Cardiovascular:      Rate and Rhythm: Normal rate and regular rhythm.      Heart sounds: Normal heart sounds.   Pulmonary:      Effort: No respiratory distress.      Breath sounds: No wheezing.   Abdominal:      Palpations: Abdomen is soft.      Tenderness: There is no abdominal tenderness. There is no guarding.   Musculoskeletal:         General: No swelling. Normal range of motion.   Skin:     General: Skin is warm and dry.   Neurological:      General: No focal deficit present.      Mental Status: She is alert and oriented to person, place, and time.   Psychiatric:         Behavior: Behavior normal.       Assessment/Plan   Problem List Items Addressed This Visit           ICD-10-CM    Encounter for screening mammogram for malignant neoplasm of breast Z12.31    Mixed hyperlipidemia E78.2    Relevant Orders    Lipid Panel    Memory loss R41.3    - We will perform a Folstein Mini-Mental status examination when she returns and I am ordering a thyroid and B12 as part of her assessment         Relevant Orders    Vitamin B12    Incontinence of urine in female - Primary R32    - Her urinalysis showed a small amount of leukocytes.  We will check a urine culture as the next step of her evaluation and she will return to go over the results         Relevant Orders    POCT UA Automated manually resulted (Completed)    Other fatigue R53.83    - I am ordering comprehensive lab work and we will see her back         Relevant Orders    Comprehensive Metabolic Panel    TSH    CBC and Auto Differential    Dysuria R30.0    Relevant Orders    Urine Culture    Elevated blood pressure reading R03.0    - Her blood pressure may be  elevated today due to her morning and she has agreed to check it at home when she has the opportunity to sit relax for 10 to 20 minutes.  I have asked that she give us an update in the next 24 to 48 hours        Patient instructions  As we discussed I am sending your urine away for culture to see if you have an infection.  If the culture comes back positive we will contact you right away.  I have also asked that you go for fasting lab work at your earliest convenience.  We are checking several labs because of your symptoms of fatigue and so forth.  Again if something comes back that is severely abnormal I will contact you right away.  Otherwise I do want to see you back relatively soon to go over results and we will do a formal memory test  Please remember that your blood pressure was elevated today and it could be because you were flustered this morning.  Please check your blood pressure at home with your personal monitor when you have an opportunity to sit relax for 10 to 20 minutes.  Please give me an update on your blood pressure readings in the next 24 to 48 hours.  If you develop sudden vision changes, numbness, tingling, or weakness of your extremities, or you are not able to talk or other strokelike symptoms please go to the emergency room immediately  We will see you back in follow-up and have also ordered a screening mammogram       Shayla Gilliland, DO

## 2025-05-01 LAB — BACTERIA UR CULT: ABNORMAL

## 2025-05-02 ENCOUNTER — TELEPHONE (OUTPATIENT)
Age: 70
End: 2025-05-02
Payer: MEDICARE

## 2025-05-05 DIAGNOSIS — R25.1 TREMOR: Primary | ICD-10-CM

## 2025-05-05 NOTE — TELEPHONE ENCOUNTER
I was able to reach her this morning.  We discussed her urine culture which looks like it is just contamination from colonization.  She is having no urinary symptoms at this time.  She states she has a tremor and slight amount of memory issues.  She states she suddenly remembered that her father  from Creutzfeldt-Kevin syndrome (which can be hereditary in 10 to 15% of cases).  I told her that we would refer her to neurology and she is agreeable.  Thank you

## 2025-05-05 NOTE — TELEPHONE ENCOUNTER
Unable to leave patient message as VM full. Need to know where patient would like Neurology referral sent.

## 2025-05-06 NOTE — TELEPHONE ENCOUNTER
Spoke with patient. She would like referral sent to [facility]. Referral faxed to [facility] to schedule with patient.

## 2025-05-08 ENCOUNTER — HOSPITAL ENCOUNTER (OUTPATIENT)
Dept: RADIOLOGY | Facility: HOSPITAL | Age: 70
Discharge: HOME | End: 2025-05-08
Payer: MEDICARE

## 2025-05-08 DIAGNOSIS — Z12.31 SCREENING MAMMOGRAM FOR BREAST CANCER: ICD-10-CM

## 2025-05-08 PROCEDURE — 77067 SCR MAMMO BI INCL CAD: CPT | Performed by: RADIOLOGY

## 2025-05-08 PROCEDURE — 77063 BREAST TOMOSYNTHESIS BI: CPT | Performed by: RADIOLOGY

## 2025-05-08 PROCEDURE — 77063 BREAST TOMOSYNTHESIS BI: CPT

## 2025-05-13 ENCOUNTER — APPOINTMENT (OUTPATIENT)
Age: 70
End: 2025-05-13
Payer: MEDICARE

## 2025-05-13 LAB
ALBUMIN SERPL-MCNC: 4.2 G/DL (ref 3.6–5.1)
ALP SERPL-CCNC: 90 U/L (ref 37–153)
ALT SERPL-CCNC: 15 U/L (ref 6–29)
ANION GAP SERPL CALCULATED.4IONS-SCNC: 9 MMOL/L (CALC) (ref 7–17)
AST SERPL-CCNC: 14 U/L (ref 10–35)
BASOPHILS # BLD AUTO: 39 CELLS/UL (ref 0–200)
BASOPHILS NFR BLD AUTO: 1 %
BILIRUB SERPL-MCNC: 0.7 MG/DL (ref 0.2–1.2)
BUN SERPL-MCNC: 15 MG/DL (ref 7–25)
CALCIUM SERPL-MCNC: 8.8 MG/DL (ref 8.6–10.4)
CHLORIDE SERPL-SCNC: 106 MMOL/L (ref 98–110)
CHOLEST SERPL-MCNC: 239 MG/DL
CHOLEST/HDLC SERPL: 4.4 (CALC)
CO2 SERPL-SCNC: 26 MMOL/L (ref 20–32)
CREAT SERPL-MCNC: 0.74 MG/DL (ref 0.5–1.05)
EGFRCR SERPLBLD CKD-EPI 2021: 88 ML/MIN/1.73M2
EOSINOPHIL # BLD AUTO: 117 CELLS/UL (ref 15–500)
EOSINOPHIL NFR BLD AUTO: 3 %
ERYTHROCYTE [DISTWIDTH] IN BLOOD BY AUTOMATED COUNT: 12.8 % (ref 11–15)
GLUCOSE SERPL-MCNC: 93 MG/DL (ref 65–99)
HCT VFR BLD AUTO: 42.9 % (ref 35–45)
HDLC SERPL-MCNC: 54 MG/DL
HGB BLD-MCNC: 14.4 G/DL (ref 11.7–15.5)
LDLC SERPL CALC-MCNC: 167 MG/DL (CALC)
LYMPHOCYTES # BLD AUTO: 1466 CELLS/UL (ref 850–3900)
LYMPHOCYTES NFR BLD AUTO: 37.6 %
MCH RBC QN AUTO: 32.5 PG (ref 27–33)
MCHC RBC AUTO-ENTMCNC: 33.6 G/DL (ref 32–36)
MCV RBC AUTO: 96.8 FL (ref 80–100)
MONOCYTES # BLD AUTO: 406 CELLS/UL (ref 200–950)
MONOCYTES NFR BLD AUTO: 10.4 %
NEUTROPHILS # BLD AUTO: 1872 CELLS/UL (ref 1500–7800)
NEUTROPHILS NFR BLD AUTO: 48 %
NONHDLC SERPL-MCNC: 185 MG/DL (CALC)
PLATELET # BLD AUTO: 184 THOUSAND/UL (ref 140–400)
PMV BLD REES-ECKER: 11 FL (ref 7.5–12.5)
POTASSIUM SERPL-SCNC: 3.9 MMOL/L (ref 3.5–5.3)
PROT SERPL-MCNC: 6.4 G/DL (ref 6.1–8.1)
RBC # BLD AUTO: 4.43 MILLION/UL (ref 3.8–5.1)
SODIUM SERPL-SCNC: 141 MMOL/L (ref 135–146)
TRIGL SERPL-MCNC: 75 MG/DL
TSH SERPL-ACNC: 5.58 MIU/L (ref 0.4–4.5)
VIT B12 SERPL-MCNC: 362 PG/ML (ref 200–1100)
WBC # BLD AUTO: 3.9 THOUSAND/UL (ref 3.8–10.8)

## 2025-05-14 ENCOUNTER — APPOINTMENT (OUTPATIENT)
Age: 70
End: 2025-05-14
Payer: MEDICARE

## 2025-05-15 ENCOUNTER — OFFICE VISIT (OUTPATIENT)
Age: 70
End: 2025-05-15
Payer: MEDICARE

## 2025-05-15 VITALS
OXYGEN SATURATION: 98 % | DIASTOLIC BLOOD PRESSURE: 84 MMHG | HEIGHT: 68 IN | HEART RATE: 84 BPM | BODY MASS INDEX: 32.27 KG/M2 | SYSTOLIC BLOOD PRESSURE: 128 MMHG | WEIGHT: 212.9 LBS

## 2025-05-15 DIAGNOSIS — R79.89 ABNORMAL TSH: ICD-10-CM

## 2025-05-15 DIAGNOSIS — R53.83 OTHER FATIGUE: ICD-10-CM

## 2025-05-15 DIAGNOSIS — E07.9 THYROID DISEASE: ICD-10-CM

## 2025-05-15 DIAGNOSIS — Z00.00 MEDICARE ANNUAL WELLNESS VISIT, SUBSEQUENT: Primary | ICD-10-CM

## 2025-05-15 DIAGNOSIS — R25.1 TREMOR: ICD-10-CM

## 2025-05-15 DIAGNOSIS — E78.2 MIXED HYPERLIPIDEMIA: ICD-10-CM

## 2025-05-15 PROBLEM — R30.0 DYSURIA: Status: RESOLVED | Noted: 2025-04-29 | Resolved: 2025-05-15

## 2025-05-15 PROBLEM — R03.0 ELEVATED BLOOD PRESSURE READING: Status: RESOLVED | Noted: 2025-04-29 | Resolved: 2025-05-15

## 2025-05-15 PROBLEM — R41.3 MEMORY LOSS: Status: RESOLVED | Noted: 2025-04-29 | Resolved: 2025-05-15

## 2025-05-15 PROBLEM — R19.5 POSITIVE FECAL OCCULT BLOOD TEST: Status: RESOLVED | Noted: 2023-11-21 | Resolved: 2025-05-15

## 2025-05-15 PROCEDURE — 99214 OFFICE O/P EST MOD 30 MIN: CPT | Performed by: INTERNAL MEDICINE

## 2025-05-15 PROCEDURE — 1159F MED LIST DOCD IN RCRD: CPT | Performed by: INTERNAL MEDICINE

## 2025-05-15 PROCEDURE — 3008F BODY MASS INDEX DOCD: CPT | Performed by: INTERNAL MEDICINE

## 2025-05-15 PROCEDURE — 1036F TOBACCO NON-USER: CPT | Performed by: INTERNAL MEDICINE

## 2025-05-15 PROCEDURE — G0439 PPPS, SUBSEQ VISIT: HCPCS | Performed by: INTERNAL MEDICINE

## 2025-05-15 PROCEDURE — 1124F ACP DISCUSS-NO DSCNMKR DOCD: CPT | Performed by: INTERNAL MEDICINE

## 2025-05-15 PROCEDURE — G2211 COMPLEX E/M VISIT ADD ON: HCPCS | Performed by: INTERNAL MEDICINE

## 2025-05-15 ASSESSMENT — PATIENT HEALTH QUESTIONNAIRE - PHQ9
SUM OF ALL RESPONSES TO PHQ9 QUESTIONS 1 AND 2: 0
2. FEELING DOWN, DEPRESSED OR HOPELESS: NOT AT ALL
1. LITTLE INTEREST OR PLEASURE IN DOING THINGS: NOT AT ALL

## 2025-05-15 NOTE — ASSESSMENT & PLAN NOTE
- I am giving her a handout on fall prevention and also to do balance exercises-we discussed eating healthy diet with routine exercise

## 2025-05-15 NOTE — PROGRESS NOTES
Subjective   Reason for Visit: Jazz Montiel is an 69 y.o. female here for a Medicare Wellness visit.     Past Medical, Surgical, and Family History reviewed and updated in chart.    Reviewed all medications by prescribing practitioner or clinical pharmacist (such as prescriptions, OTCs, herbal therapies and supplements) and documented in the medical record.    HPI  She is here today for follow-up.  We took this opportunity to complete her annual Medicare wellness visit.  We discussed her mood.  She states her hobbies and enjoyment has shifted.  She is now working on an old house with her significant other and she also enjoys engaging with a [employer] online.  She has had no falls in the last year and we talked about fall prevention.  I am giving her 2 handouts via [employer]  1 is on balance exercises and the other is on ways to reduce her risk of falls in the home.  We also formed a Folstein Mini-Mental status examination and she scored 30 out of 30.  She has been referred to neurology because of the tremor in her thumb and she was encouraged to discuss her memory concerns with them as well.  She understands that the test we did today is an abbreviated test but there are ways to do more extensive testing at larger centers.  Overall it appears that her memory is okay.  We would encourage her to eat a healthy diet and we discussed the importance of routine exercise.  She is reminded that we want to try to get 30 minutes of aerobic activity 5 days a week.  We also went over the results of recent lab work.  Overall her numbers were great with the exception of a slightly abnormal thyroid blood test and high cholesterol.  She does not want to take cholesterol medicine so I am recommending that she take a fish oil supplement.  I am suggesting that we recheck her TSH in 3 months and if it does not normalize I will call her about medication at [facility].  She will not need to come back for the thyroid.  She is on no prescription  "medication and we agreed that if everything goes according to plan we can see her back on an annual basis.  Patient Care Team:  Shayla Gilliland DO as PCP - General (Internal Medicine)  Shayla Gilliland DO     Objective   Vitals:  /84   Pulse 84   Ht 1.727 m (5' 7.99\")   Wt 96.6 kg (212 lb 14.4 oz)   SpO2 98%   BMI 32.38 kg/m²       Physical Exam  Vitals and nursing note reviewed.   Constitutional:       General: She is not in acute distress.     Appearance: Normal appearance.   HENT:      Head: Normocephalic and atraumatic.   Eyes:      Conjunctiva/sclera: Conjunctivae normal.   Cardiovascular:      Rate and Rhythm: Normal rate and regular rhythm.      Heart sounds: Normal heart sounds.   Pulmonary:      Effort: No respiratory distress.      Breath sounds: No wheezing.   Abdominal:      Palpations: Abdomen is soft.      Tenderness: There is no abdominal tenderness. There is no guarding.   Musculoskeletal:         General: No swelling. Normal range of motion.   Skin:     General: Skin is warm and dry.   Neurological:      General: No focal deficit present.      Mental Status: She is alert and oriented to person, place, and time.   Psychiatric:         Behavior: Behavior normal.       Recent Results (from the past 4 weeks)   POCT UA Automated manually resulted    Collection Time: 04/29/25  8:19 AM   Result Value Ref Range    POC Color, Urine Re (A) Straw, Yellow, Light-Yellow    POC Appearance, Urine Clear Clear    POC Glucose, Urine NEGATIVE NEGATIVE mg/dl    POC Bilirubin, Urine NEGATIVE NEGATIVE    POC Ketones, Urine NEGATIVE NEGATIVE mg/dl    POC Specific Gravity, Urine >=1.030 1.005 - 1.035    POC Blood, Urine NEGATIVE NEGATIVE    POC PH, Urine 5.5 No Reference Range Established PH    POC Protein, Urine NEGATIVE NEGATIVE mg/dl    POC Urobilinogen, Urine 0.2 0.2, 1.0 EU/DL    Poc Nitrite, Urine NEGATIVE NEGATIVE    POC Leukocytes, Urine SMALL (1+) (A) NEGATIVE   Urine Culture    Collection Time: " 04/29/25  8:41 AM    Specimen: Clean Catch/Voided; Urine   Result Value Ref Range    CULTURE, URINE, ROUTINE SEE NOTE (A)    Comprehensive Metabolic Panel    Collection Time: 05/12/25  8:07 AM   Result Value Ref Range    GLUCOSE 93 65 - 99 mg/dL    UREA NITROGEN (BUN) 15 7 - 25 mg/dL    CREATININE 0.74 0.50 - 1.05 mg/dL    EGFR 88 > OR = 60 mL/min/1.73m2    SODIUM 141 135 - 146 mmol/L    POTASSIUM 3.9 3.5 - 5.3 mmol/L    CHLORIDE 106 98 - 110 mmol/L    CARBON DIOXIDE 26 20 - 32 mmol/L    ELECTROLYTE BALANCE 9 7 - 17 mmol/L (calc)    CALCIUM 8.8 8.6 - 10.4 mg/dL    PROTEIN, TOTAL 6.4 6.1 - 8.1 g/dL    ALBUMIN 4.2 3.6 - 5.1 g/dL    BILIRUBIN, TOTAL 0.7 0.2 - 1.2 mg/dL    ALKALINE PHOSPHATASE 90 37 - 153 U/L    AST 14 10 - 35 U/L    ALT 15 6 - 29 U/L   TSH    Collection Time: 05/12/25  8:07 AM   Result Value Ref Range    TSH 5.58 (H) 0.40 - 4.50 mIU/L   Vitamin B12    Collection Time: 05/12/25  8:07 AM   Result Value Ref Range    VITAMIN B12 362 200 - 1,100 pg/mL   Lipid Panel    Collection Time: 05/12/25  8:07 AM   Result Value Ref Range    CHOLESTEROL, TOTAL 239 (H) <200 mg/dL    HDL CHOLESTEROL 54 > OR = 50 mg/dL    TRIGLYCERIDES 75 <150 mg/dL    LDL-CHOLESTEROL 167 (H) mg/dL (calc)    CHOL/HDLC RATIO 4.4 <5.0 (calc)    NON HDL CHOLESTEROL 185 (H) <130 mg/dL (calc)   CBC and Auto Differential    Collection Time: 05/12/25  8:07 AM   Result Value Ref Range    WHITE BLOOD CELL COUNT 3.9 3.8 - 10.8 Thousand/uL    RED BLOOD CELL COUNT 4.43 3.80 - 5.10 Million/uL    HEMOGLOBIN 14.4 11.7 - 15.5 g/dL    HEMATOCRIT 42.9 35.0 - 45.0 %    MCV 96.8 80.0 - 100.0 fL    MCH 32.5 27.0 - 33.0 pg    MCHC 33.6 32.0 - 36.0 g/dL    RDW 12.8 11.0 - 15.0 %    PLATELET COUNT 184 140 - 400 Thousand/uL    MPV 11.0 7.5 - 12.5 fL    ABSOLUTE NEUTROPHILS 1,872 1,500 - 7,800 cells/uL    ABSOLUTE LYMPHOCYTES 1,466 850 - 3,900 cells/uL    ABSOLUTE MONOCYTES 406 200 - 950 cells/uL    ABSOLUTE EOSINOPHILS 117 15 - 500 cells/uL    ABSOLUTE BASOPHILS  39 0 - 200 cells/uL    NEUTROPHILS 48 %    LYMPHOCYTES 37.6 %    MONOCYTES 10.4 %    EOSINOPHILS 3.0 %    BASOPHILS 1.0 %       Assessment & Plan  Medicare annual wellness visit, subsequent  - I am giving her a handout on fall prevention and also to do balance exercises-we discussed eating healthy diet with routine exercise           Mixed hyperlipidemia  -Her cholesterol is elevated and we discussed going on medication but she would prefer not to do that-  -she has had a prior coronary calcification test which came back 0  -I suggested that she try the cholesterol diet of course and also omega-3 fish oil         Other fatigue  - We reviewed laboratory workup today         Tremor  - She has a slight tremor and also a family history of Creutzfeldt Ruddy syndrome.  She has an appointment to see neurology in the near future October 1         Abnormal TSH  - Her TSH was borderline so she will go back to the lab in 3 months for another TSH and have agreed to contact her with results    Orders:    TSH; Future    Thyroid disease    Orders:    TSH; Future  Patient instructions  As we discussed we did a Folstein Mini-Mental status examination today and he did very well scoring 30 out of 30.  At this point I am not seeing severe changes with your memory.  I am glad you will be seeing the neurologist for your tremor and if you are still concerned about your memory you could ask them what they advise  Please read the handouts I sent to you via [employer].  1 is on balance exercises and I recommend that you do those 5 minutes at the start of every day.  Please also go through the fall prevention checklist to your home.  Please remember to try to get 30 minutes of aerobic activity 5 days a week  Please danielito your calendar to go back to the lab in 3 months for another TSH and I will contact you with the results  I am recommending that you get over-the-counter omega-3 fish oil and we will check your cholesterol again in 1 year per  Medicare protocol  We will see you back in 1 year but please know that we are happy to see you back at any time with any other concerns

## 2025-05-15 NOTE — ASSESSMENT & PLAN NOTE
- Her TSH was borderline so she will go back to the lab in 3 months for another TSH and have agreed to contact her with results    Orders:    TSH; Future

## 2025-05-15 NOTE — ASSESSMENT & PLAN NOTE
Orders:    TSH; Future  Patient instructions  As we discussed we did a Folstein Mini-Mental status examination today and he did very well scoring 30 out of 30.  At this point I am not seeing severe changes with your memory.  I am glad you will be seeing the neurologist for your tremor and if you are still concerned about your memory you could ask them what they advise  Please read the handouts I sent to you via [employer].  1 is on balance exercises and I recommend that you do those 5 minutes at the start of every day.  Please also go through the fall prevention checklist to your home.  Please remember to try to get 30 minutes of aerobic activity 5 days a week  Please danielito your calendar to go back to the lab in 3 months for another TSH and I will contact you with the results  I am recommending that you get over-the-counter omega-3 fish oil and we will check your cholesterol again in 1 year per Medicare protocol  We will see you back in 1 year but please know that we are happy to see you back at any time with any other concerns

## 2025-05-15 NOTE — PATIENT INSTRUCTIONS
Patient instructions  As we discussed we did a Folstein Mini-Mental status examination today and he did very well scoring 30 out of 30.  At this point I am not seeing severe changes with your memory.  I am glad you will be seeing the neurologist for your tremor and if you are still concerned about your memory you could ask them what they advise  Please read the handouts I sent to you via [employer].  1 is on balance exercises and I recommend that you do those 5 minutes at the start of every day.  Please also go through the fall prevention checklist to your home.  Please remember to try to get 30 minutes of aerobic activity 5 days a week  Please danielito your calendar to go back to the lab in 3 months for another TSH and I will contact you with the results  I am recommending that you get over-the-counter omega-3 fish oil and we will check your cholesterol again in 1 year per Medicare protocol  We will see you back in 1 year but please know that we are happy to see you back at any time with any other concerns

## 2025-05-15 NOTE — ASSESSMENT & PLAN NOTE
- She has a slight tremor and also a family history of Creutzfeldt Ruddy syndrome.  She has an appointment to see neurology in the near future October 1

## 2025-05-15 NOTE — ASSESSMENT & PLAN NOTE
-Her cholesterol is elevated and we discussed going on medication but she would prefer not to do that-  -she has had a prior coronary calcification test which came back 0  -I suggested that she try the cholesterol diet of course and also omega-3 fish oil

## 2025-08-15 DIAGNOSIS — E07.9 THYROID DISEASE: ICD-10-CM

## 2025-08-15 DIAGNOSIS — R79.89 ABNORMAL TSH: ICD-10-CM

## 2026-05-15 ENCOUNTER — APPOINTMENT (OUTPATIENT)
Age: 71
End: 2026-05-15
Payer: MEDICARE